# Patient Record
Sex: FEMALE | Race: WHITE | Employment: FULL TIME | URBAN - METROPOLITAN AREA
[De-identification: names, ages, dates, MRNs, and addresses within clinical notes are randomized per-mention and may not be internally consistent; named-entity substitution may affect disease eponyms.]

---

## 2019-06-06 ENCOUNTER — OFFICE VISIT (OUTPATIENT)
Dept: ORTHOPEDIC SURGERY | Age: 34
End: 2019-06-06
Payer: COMMERCIAL

## 2019-06-06 VITALS
BODY MASS INDEX: 29.88 KG/M2 | DIASTOLIC BLOOD PRESSURE: 83 MMHG | WEIGHT: 175 LBS | SYSTOLIC BLOOD PRESSURE: 112 MMHG | HEART RATE: 106 BPM | HEIGHT: 64 IN

## 2019-06-06 DIAGNOSIS — M79.644 PAIN OF FINGER OF RIGHT HAND: Primary | ICD-10-CM

## 2019-06-06 DIAGNOSIS — S60.041A CONTUSION OF RIGHT RING FINGER WITHOUT DAMAGE TO NAIL, INITIAL ENCOUNTER: ICD-10-CM

## 2019-06-06 DIAGNOSIS — M20.011 MALLET FINGER OF RIGHT HAND: ICD-10-CM

## 2019-06-06 PROCEDURE — 99202 OFFICE O/P NEW SF 15 MIN: CPT | Performed by: FAMILY MEDICINE

## 2019-06-06 NOTE — PROGRESS NOTES
Chief Complaint  Finger Pain (N RIGHT RING FINGER)    Initial consultation right ring finger swelling with motion loss status post contusion    History of Present Illness:  Edgardo Nguyen is a 34 y.o. female is a very pleasant right-hand dominant white female office and  for [facility] who is a patient of Dr. Lisbet Dubose who is being seen today upon self-referral for evaluation and of an injury to her right ring finger. New Patient:       Patient is being seen today for acute right 4th finger pain. The patient reports on Saturday, June 1st she tripped in her home and got her finger stuck in her keys and then got her finger squished between keys and the counter. There is no pop or crack and this was a forced flexion injury at the right ring finger DIP joint. Patient reports pain as not changing, simply pain when trying to bend/extend DIP joint. Currently pain is a 3 out of 10 on the pain scale. This problem has been an issue for 5 days. The problem is constant or is associated with bending. Patient has other associated symptoms: mallet finger deformity. Patient has attempted rest, ice to treat this problem and symptoms are show no change. Patient does not have a previous history of finger injury. She is 31 weeks pregnant and is due in August. Patient is being seen today for orthopedic and sports consultation and review of imaging. Pain Assessment  Location of Pain: Finger  Location Modifiers: Right  Severity of Pain: 3  Quality of Pain: Sharp, Aching  Duration of Pain: Persistent  Frequency of Pain: Constant  Date Pain First Started: 06/01/19  Aggravating Factors: Bending, Stretching, Straightening  Limiting Behavior: Yes  Relieving Factors: Rest  Result of Injury: Yes  Work-Related Injury: No  Are there other pain locations you wish to document?: No     I have reviewed and agree with the documentation of the HPI documented by my .  I will make any changes if ligament testing. No apparent DIP joint subluxation. No rotational defects. Skin: There are no rashes, ulcerations or lesions. Distal motor sensory and vascular exam is intact. Gait: Fluid smooth gait       Reflex symmetrically preserved      Additional Comments:             Additional Examinations:     Contralateral Exam: Contralateral left hand exam is benign. Right Upper Extremity:  Examination of the right upper extremity does not show any tenderness, deformity or injury. Range of motion is unremarkable. There is no gross instability. There are no rashes, ulcerations or lesions. Strength and tone are normal.  Left Upper Extremity: Examination of the left upper extremity does not show any tenderness, deformity or injury. Range of motion is unremarkable. There is no gross instability. There are no rashes, ulcerations or lesions. Strength and tone are normal.        Diagnostic Test Findings: No results found. Right ring finger AP lateral and oblique films do show evidence of a dorsal avulsion fracture to the distal phalanx of the right ring finger consistent with bony mallet. There is appeared to be DIP joint subluxation. Assessment & Plan:    Encounter Diagnoses   Name Primary?     Pain of finger of right hand Yes    Mallet finger of right hand     Contusion of right ring finger without damage to nail, initial encounter        Orders Placed This Encounter   Procedures    XR FINGER RIGHT (MIN 2 VIEWS)     Standing Status:   Future     Number of Occurrences:   1     Standing Expiration Date:   6/6/2020     Order Specific Question:   Reason for exam:     Answer:   pain    External Referral To Occupational Therapy     Referral Priority:   Routine     Referral Type:   Eval and Treat     Referral Reason:   Specialty Services Required     Requested Specialty:   Occupational Therapy     Number of Visits Requested:   1           Treatment Plan:  Treatment options were discussed withCatarina Yasmin Briggs. We did review her plain films and exam findings. She is now 5 days out from a bony mallet without evidence of DIP joint subluxation. We will send her to hand therapy to be placed in a DIP joint hyperextension splint. She is 31 weeks pregnant and may supplement with Tylenol. She does state that she bumped this the pain is not substantial.  She may ice. The importance of continuous extension splinting involving the ring finger DIP joint was explained. I would like to see her back in a week for an x-ray check to ensure that she is not undergoing subluxation at the DIP joint volarly. Interim with questions or concerns. This dictation was performed with a verbal recognition program (DRAGON) and it was checked for errors. It is possible that there are still dictated errors within this office note. If so, please bring any errors to my attention for an addendum. All efforts were made to ensure that this office note is accurate.

## 2019-06-13 ENCOUNTER — OFFICE VISIT (OUTPATIENT)
Dept: ORTHOPEDIC SURGERY | Age: 34
End: 2019-06-13
Payer: COMMERCIAL

## 2019-06-13 VITALS
DIASTOLIC BLOOD PRESSURE: 75 MMHG | SYSTOLIC BLOOD PRESSURE: 115 MMHG | BODY MASS INDEX: 30.73 KG/M2 | WEIGHT: 180 LBS | HEART RATE: 84 BPM | HEIGHT: 64 IN

## 2019-06-13 DIAGNOSIS — M79.644 PAIN OF FINGER OF RIGHT HAND: Primary | ICD-10-CM

## 2019-06-13 DIAGNOSIS — M20.011 MALLET FINGER OF RIGHT HAND: ICD-10-CM

## 2019-06-13 DIAGNOSIS — S60.041A CONTUSION OF RIGHT RING FINGER WITHOUT DAMAGE TO NAIL, INITIAL ENCOUNTER: ICD-10-CM

## 2019-06-13 PROCEDURE — 99213 OFFICE O/P EST LOW 20 MIN: CPT | Performed by: FAMILY MEDICINE

## 2019-06-13 NOTE — PROGRESS NOTES
Chief Complaint  Follow-up (check finger)    Follow-up right ring bony mallet finger swelling with motion loss status post contusion    History of Present Illness:  Ric Piedra is a 34 y.o. female is a very pleasant right-hand dominant white female office and  for [employer] who is a patient of Dr. Dionicio Richey who is being seen today upon self-referral for evaluation and of an injury to her right ring finger. New Patient:       Patient is being seen today for acute right 4th finger pain. The patient reports on Saturday, June 1st she tripped in her home and got her finger stuck in her keys and then got her finger squished between keys and the counter. There is no pop or crack and this was a forced flexion injury at the right ring finger DIP joint. Patient reports pain as not changing, simply pain when trying to bend/extend DIP joint. Currently pain is a 3 out of 10 on the pain scale. This problem has been an issue for 5 days. The problem is constant or is associated with bending. Patient has other associated symptoms: mallet finger deformity. Patient has attempted rest, ice to treat this problem and symptoms are show no change. Patient does not have a previous history of finger injury. She is 31 weeks pregnant and is due in August. Patient is being seen today for orthopedic and sports consultation and review of imaging. I have reviewed and agree with the documentation of the HPI documented by my . I will make any changes if necessary. Enc Date: 6/13/2019  Time: 2:56 PM  Provider: Shamar Barrientos MD    She was seen last in the office on 6/6/2019 diagnosed with a bony mallet. She is now 12 days after injury and is in her mallet hyperextension splint. She is not having any type of pain is been very cautious to avoid flexion at the DIP joint of the right ring finger. She has been continuous splinting is not requiring any medications at this time.   She is here for an x-ray check and reevaluation. She is much more comfortable over last week. Review of Systems  Pertinent items are noted in HPI  Review of systems reviewed from Patient History Form dated on 6/6/2019 and available in the patient's chart under the Media tab. Vital Signs     /75   Pulse 84   Ht 5' 4\" (1.626 m)   Wt 180 lb (81.6 kg)   BMI 30.90 kg/m²     Current Outpatient Medications   Medication Sig Dispense Refill    Prenatal Vit-Fe Fumarate-FA (PRENATAL VITAMIN PO) Take by mouth       No current facility-administered medications for this visit. General Exam:   Constitutional: Patient is adequately groomed with no evidence of malnutrition  DTRs: Deep tendon reflexes are intact  Mental Status: The patient is oriented to time, place and person. The patient's mood and affect are appropriate. Lymphatic: The lymphatic examination bilaterally reveals all areas to be without enlargement or induration. Vascular: Examination reveals no swelling or calf tenderness. Peripheral pulses are palpable and 2+. Neurological: The patient has good coordination. There is no weakness or sensory deficit. Right hand Examination    Inspection:  She does have an obvious mallet deformity to the right hand ring finger with extension lag at the DIP joint with mild soft tissue swelling. 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 Palpation:  She no has significant clinical tender over the extensor tendon insertion of the DIP joint dorsally. No substantial collateral ligament tenderness. Rang of Motion:  She does lack 10-15 ° of active extension at the right ring finger DIP joint. Reasonable range of motion in flexion as well as the PIP and MCP joint. Strength:  She does have weakness 3+ to 4 minus out of 5 with gentle resisted extension at the right ring finger DIP joint. Remainder of tendon function is intact. No evidence of instability.     Special Tests:  No evidence of is not having pain. Once again she is 32 weeks pregnant may supplement with Tylenol. I would like for her to continue with her mallet extension splint continuously for the next 5 weeks. We will see her back at that time for repeat evaluation and finger imaging. I would like for her to contact us in the interim with questions or concerns. She may ice. Activity modification was discussed. This dictation was performed with a verbal recognition program (DRAGON) and it was checked for errors. It is possible that there are still dictated errors within this office note. If so, please bring any errors to my attention for an addendum. All efforts were made to ensure that this office note is accurate.

## 2019-07-18 ENCOUNTER — OFFICE VISIT (OUTPATIENT)
Dept: ORTHOPEDIC SURGERY | Age: 34
End: 2019-07-18
Payer: COMMERCIAL

## 2019-07-18 VITALS
HEART RATE: 101 BPM | SYSTOLIC BLOOD PRESSURE: 139 MMHG | DIASTOLIC BLOOD PRESSURE: 89 MMHG | BODY MASS INDEX: 31.58 KG/M2 | WEIGHT: 185 LBS | HEIGHT: 64 IN

## 2019-07-18 DIAGNOSIS — S60.041A CONTUSION OF RIGHT RING FINGER WITHOUT DAMAGE TO NAIL, INITIAL ENCOUNTER: ICD-10-CM

## 2019-07-18 DIAGNOSIS — M79.644 PAIN OF FINGER OF RIGHT HAND: Primary | ICD-10-CM

## 2019-07-18 DIAGNOSIS — M20.011 MALLET FINGER OF RIGHT HAND: ICD-10-CM

## 2019-07-18 PROCEDURE — 99213 OFFICE O/P EST LOW 20 MIN: CPT | Performed by: FAMILY MEDICINE

## 2019-07-18 NOTE — PROGRESS NOTES
medications at this time. She is here for an x-ray check and reevaluation. She is much more comfortable over last week. She was last seen in the office on 6/13/2019 continued on treatment for her right ring finger bony mallet. She is now 6-1/2 weeks out from her mallet injury and is doing very well. She does have the expected stiffness and has been very compliant with continuous usage of her mallet splint. She does not really exhibit much in the way of an extension lag. She is due on August 6 with her first baby. She is not requiring any medications at this time. No numbness or tingling. Review of Systems  Pertinent items are noted in HPI  Review of systems reviewed from Patient History Form dated on 6/6/2019 and available in the patient's chart under the Media tab. Vital Signs     /89   Pulse 101   Ht 5' 4\" (1.626 m)   Wt 185 lb (83.9 kg)   BMI 31.76 kg/m²     Current Outpatient Medications   Medication Sig Dispense Refill    Prenatal Vit-Fe Fumarate-FA (PRENATAL VITAMIN PO) Take by mouth       No current facility-administered medications for this visit. General Exam:   Constitutional: Patient is adequately groomed with no evidence of malnutrition  DTRs: Deep tendon reflexes are intact  Mental Status: The patient is oriented to time, place and person. The patient's mood and affect are appropriate. Lymphatic: The lymphatic examination bilaterally reveals all areas to be without enlargement or induration. Vascular: Examination reveals no swelling or calf tenderness. Peripheral pulses are palpable and 2+. Neurological: The patient has good coordination. There is no weakness or sensory deficit. Right hand Examination    Inspection:  She straight a substantial improvement in her previous mallet deformity to the right hand ring finger about substantial extension lag at the DIP joint. 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     Palpation:  She no has significant without damage to nail, initial encounter        Orders Placed This Encounter   Procedures    XR FINGER RIGHT (MIN 2 VIEWS)     Standing Status:   Future     Number of Occurrences:   1     Standing Expiration Date:   7/18/2020     Order Specific Question:   Reason for exam:     Answer:   pain    Amb External Referral To Occupational Therapy     Referral Priority:   Routine     Referral Type:   Occupational Therapy     Referral Reason:   Specialty Services Required     Referred to Provider: Josh Lema OT     Requested Specialty:   Occupational Therapy     Number of Visits Requested:   1           Treatment Plan:  Treatment options were discussed Chana Irizarry. We did review her plain films and exam findings. She is now 6-1/2 weeks out from a bony mallet without evidence of DIP joint subluxation. Clinically this time she is feeling much better and her splint is not having pain. Once again she is 37 weeks pregnant may supplement with Tylenol. She looks outstanding today and is not having any type of pain and does have the expected stiffness at the DIP joint from her mallet splint. She is been very good about using this continuously but I think we can begin to wean her out of the splint. I would like for her to see hand therapy to begin graduated motion exercises and strengthening exercises. Her x-rays look very good today. We will see her back in about a month for follow-up to ensure that she is doing well. She will likely deliver her baby before that and will contact us with questions or concerns in the interim. She may use her mallet splint with exertional activities involving her hand for the next couple of weeks as she goes through hand therapy. She will call with questions or concerns. This dictation was performed with a verbal recognition program (DRAGON) and it was checked for errors. It is possible that there are still dictated errors within this office note.  If so, please bring any